# Patient Record
Sex: FEMALE | Race: BLACK OR AFRICAN AMERICAN | NOT HISPANIC OR LATINO | Employment: FULL TIME | URBAN - METROPOLITAN AREA
[De-identification: names, ages, dates, MRNs, and addresses within clinical notes are randomized per-mention and may not be internally consistent; named-entity substitution may affect disease eponyms.]

---

## 2018-10-24 ENCOUNTER — HOSPITAL ENCOUNTER (EMERGENCY)
Facility: HOSPITAL | Age: 18
Discharge: PSYCHIATRIC OR SUBSTANCE USE FACILITY | End: 2018-10-25
Attending: EMERGENCY MEDICINE
Payer: MEDICAID

## 2018-10-24 DIAGNOSIS — F31.9 BIPOLAR I DISORDER WITH MIXED FEATURES: ICD-10-CM

## 2018-10-24 DIAGNOSIS — F31.9 BIPOLAR AFFECTIVE DISORDER, REMISSION STATUS UNSPECIFIED: Primary | ICD-10-CM

## 2018-10-24 LAB
ALBUMIN SERPL BCP-MCNC: 4.5 G/DL
ALP SERPL-CCNC: 57 U/L
ALT SERPL W/O P-5'-P-CCNC: 11 U/L
AMPHET+METHAMPHET UR QL: NEGATIVE
ANION GAP SERPL CALC-SCNC: 12 MMOL/L
AST SERPL-CCNC: 21 U/L
BARBITURATES UR QL SCN>200 NG/ML: NEGATIVE
BASOPHILS # BLD AUTO: 0 K/UL
BASOPHILS NFR BLD: 0 %
BENZODIAZ UR QL SCN>200 NG/ML: NEGATIVE
BILIRUB SERPL-MCNC: 0.5 MG/DL
BILIRUB UR QL STRIP: NEGATIVE
BUN SERPL-MCNC: 9 MG/DL
BZE UR QL SCN: NEGATIVE
CALCIUM SERPL-MCNC: 9.9 MG/DL
CANNABINOIDS UR QL SCN: NORMAL
CHLORIDE SERPL-SCNC: 105 MMOL/L
CLARITY UR: CLEAR
CO2 SERPL-SCNC: 22 MMOL/L
COLOR UR: YELLOW
CREAT SERPL-MCNC: 0.9 MG/DL
CREAT UR-MCNC: 245.2 MG/DL
DIFFERENTIAL METHOD: ABNORMAL
EOSINOPHIL # BLD AUTO: 0 K/UL
EOSINOPHIL NFR BLD: 0.3 %
ERYTHROCYTE [DISTWIDTH] IN BLOOD BY AUTOMATED COUNT: 14 %
EST. GFR  (AFRICAN AMERICAN): >60 ML/MIN/1.73 M^2
EST. GFR  (NON AFRICAN AMERICAN): >60 ML/MIN/1.73 M^2
GLUCOSE SERPL-MCNC: 88 MG/DL
GLUCOSE UR QL STRIP: NEGATIVE
HCT VFR BLD AUTO: 36.9 %
HGB BLD-MCNC: 12.3 G/DL
HGB UR QL STRIP: NEGATIVE
KETONES UR QL STRIP: NEGATIVE
LEUKOCYTE ESTERASE UR QL STRIP: NEGATIVE
LYMPHOCYTES # BLD AUTO: 1.7 K/UL
LYMPHOCYTES NFR BLD: 28 %
MCH RBC QN AUTO: 27.6 PG
MCHC RBC AUTO-ENTMCNC: 33.3 G/DL
MCV RBC AUTO: 83 FL
METHADONE UR QL SCN>300 NG/ML: NEGATIVE
MONOCYTES # BLD AUTO: 0.4 K/UL
MONOCYTES NFR BLD: 5.8 %
NEUTROPHILS # BLD AUTO: 4 K/UL
NEUTROPHILS NFR BLD: 65.9 %
NITRITE UR QL STRIP: NEGATIVE
OPIATES UR QL SCN: NEGATIVE
PCP UR QL SCN>25 NG/ML: NEGATIVE
PH UR STRIP: 6 [PH] (ref 5–8)
PLATELET # BLD AUTO: 273 K/UL
PMV BLD AUTO: 10.4 FL
POTASSIUM SERPL-SCNC: 3.5 MMOL/L
PROT SERPL-MCNC: 8.1 G/DL
PROT UR QL STRIP: NEGATIVE
RBC # BLD AUTO: 4.46 M/UL
SODIUM SERPL-SCNC: 139 MMOL/L
SP GR UR STRIP: 1.02 (ref 1–1.03)
TOXICOLOGY INFORMATION: NORMAL
URN SPEC COLLECT METH UR: NORMAL
UROBILINOGEN UR STRIP-ACNC: NEGATIVE EU/DL
WBC # BLD AUTO: 6 K/UL

## 2018-10-24 PROCEDURE — 80053 COMPREHEN METABOLIC PANEL: CPT

## 2018-10-24 PROCEDURE — 80307 DRUG TEST PRSMV CHEM ANLYZR: CPT

## 2018-10-24 PROCEDURE — 80320 DRUG SCREEN QUANTALCOHOLS: CPT

## 2018-10-24 PROCEDURE — 81003 URINALYSIS AUTO W/O SCOPE: CPT | Mod: 59

## 2018-10-24 PROCEDURE — 80329 ANALGESICS NON-OPIOID 1 OR 2: CPT

## 2018-10-24 PROCEDURE — 84443 ASSAY THYROID STIM HORMONE: CPT

## 2018-10-24 PROCEDURE — 99282 EMERGENCY DEPT VISIT SF MDM: CPT | Mod: GT,,, | Performed by: PSYCHIATRY & NEUROLOGY

## 2018-10-24 PROCEDURE — 99284 EMERGENCY DEPT VISIT MOD MDM: CPT

## 2018-10-24 PROCEDURE — 85025 COMPLETE CBC W/AUTO DIFF WBC: CPT

## 2018-10-25 VITALS
TEMPERATURE: 98 F | HEIGHT: 64 IN | OXYGEN SATURATION: 100 % | WEIGHT: 216 LBS | HEART RATE: 70 BPM | BODY MASS INDEX: 36.88 KG/M2 | SYSTOLIC BLOOD PRESSURE: 123 MMHG | DIASTOLIC BLOOD PRESSURE: 76 MMHG | RESPIRATION RATE: 18 BRPM

## 2018-10-25 PROBLEM — F31.4 BIPOLAR 1 DISORDER, DEPRESSED, SEVERE: Status: ACTIVE | Noted: 2018-10-25

## 2018-10-25 PROBLEM — F17.200 TOBACCO DEPENDENCE: Status: ACTIVE | Noted: 2018-10-25

## 2018-10-25 PROBLEM — F31.9 BIPOLAR 1 DISORDER: Status: ACTIVE | Noted: 2018-10-25

## 2018-10-25 PROBLEM — F12.10 CANNABIS ABUSE: Status: ACTIVE | Noted: 2018-10-25

## 2018-10-25 LAB
APAP SERPL-MCNC: <3 UG/ML
ETHANOL SERPL-MCNC: <10 MG/DL
TSH SERPL DL<=0.005 MIU/L-ACNC: 2.08 UIU/ML
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
## 2023-02-02 NOTE — ED NOTES
Discharged with SPD and security per WC.  Clothing, purse and cell phone given to  for transport with pt.  
Introduced self to patient and attempted to get blood on patient. Patient walked away from me and refused for me to get blood. Dr. Paul made aware and said to hold off on getting blood at this time and to have tele psych speak to patient first.  
[facility] CALLED FOR PEC TRANSPORT TO [facility]  
PEC received in CPT.  Packet faxed to [facility], [facility], [facility], and [facility] per protocol.  
Patient is calm at this time, allowed me to stick for blood.  
Patient is medically clear per Dr. Paul. [facility] has been called and patient's PEC has been faxed to them.  
Patient was taken to the bathroom. Patient is calm and cooperative. Will continue to monitor.  
Pt placed in scrubs at this time and belongings placed in bag.  Pt allowed to keep phone as long as she is cooperative until evaluated by MD.    
Received call from Jeremie at [facility].  Patient accepted under the care of Dr Choi.  Number to call report:  [phone]  
Received call from Lyric from centralized placement. Patient has been accepted to [facility].  
Report given to Paul DOWELL at [facility].  
SPD here to  pt.  
Spoke to Micheal at [facility]. Given patient's last set of VS.  
Transfer center called and tele psych set up  
[phone]